# Patient Record
Sex: MALE | Race: BLACK OR AFRICAN AMERICAN | NOT HISPANIC OR LATINO | Employment: OTHER | URBAN - METROPOLITAN AREA
[De-identification: names, ages, dates, MRNs, and addresses within clinical notes are randomized per-mention and may not be internally consistent; named-entity substitution may affect disease eponyms.]

---

## 2024-11-12 ENCOUNTER — APPOINTMENT (OUTPATIENT)
Dept: OTOLARYNGOLOGY | Facility: CLINIC | Age: 84
End: 2024-11-12
Payer: COMMERCIAL

## 2024-11-12 VITALS — WEIGHT: 172 LBS | BODY MASS INDEX: 27 KG/M2 | HEIGHT: 67 IN

## 2024-11-12 DIAGNOSIS — R49.0 CHRONIC HOARSENESS: ICD-10-CM

## 2024-11-12 DIAGNOSIS — J34.89 NASAL SEPTAL PERFORATION: ICD-10-CM

## 2024-11-12 DIAGNOSIS — K21.9 LARYNGOPHARYNGEAL REFLUX (LPR): Primary | ICD-10-CM

## 2024-11-12 DIAGNOSIS — H61.23 BILATERAL IMPACTED CERUMEN: ICD-10-CM

## 2024-11-12 DIAGNOSIS — H90.0 CONDUCTIVE HEARING LOSS, BILATERAL: ICD-10-CM

## 2024-11-12 PROCEDURE — 69210 REMOVE IMPACTED EAR WAX UNI: CPT | Performed by: STUDENT IN AN ORGANIZED HEALTH CARE EDUCATION/TRAINING PROGRAM

## 2024-11-12 PROCEDURE — 31575 DIAGNOSTIC LARYNGOSCOPY: CPT | Performed by: STUDENT IN AN ORGANIZED HEALTH CARE EDUCATION/TRAINING PROGRAM

## 2024-11-12 PROCEDURE — 99204 OFFICE O/P NEW MOD 45 MIN: CPT | Performed by: STUDENT IN AN ORGANIZED HEALTH CARE EDUCATION/TRAINING PROGRAM

## 2024-11-12 PROCEDURE — 1159F MED LIST DOCD IN RCRD: CPT | Performed by: STUDENT IN AN ORGANIZED HEALTH CARE EDUCATION/TRAINING PROGRAM

## 2024-11-12 PROCEDURE — 1036F TOBACCO NON-USER: CPT | Performed by: STUDENT IN AN ORGANIZED HEALTH CARE EDUCATION/TRAINING PROGRAM

## 2024-11-12 RX ORDER — FAMOTIDINE 20 MG/1
20 TABLET, FILM COATED ORAL EVERY EVENING
Qty: 90 TABLET | Refills: 1 | Status: SHIPPED | OUTPATIENT
Start: 2024-11-12 | End: 2025-05-11

## 2024-11-12 RX ORDER — PANTOPRAZOLE SODIUM 40 MG/1
40 TABLET, DELAYED RELEASE ORAL
COMMUNITY
Start: 2022-12-02

## 2024-11-12 RX ORDER — LANOLIN ALCOHOL/MO/W.PET/CERES
400 CREAM (GRAM) TOPICAL DAILY
COMMUNITY

## 2024-11-12 RX ORDER — LOSARTAN POTASSIUM AND HYDROCHLOROTHIAZIDE 25; 100 MG/1; MG/1
1 TABLET ORAL DAILY
COMMUNITY

## 2024-11-12 RX ORDER — LISINOPRIL AND HYDROCHLOROTHIAZIDE 20; 25 MG/1; MG/1
1 TABLET ORAL DAILY
COMMUNITY

## 2024-11-12 RX ORDER — AMLODIPINE BESYLATE 10 MG/1
10 TABLET ORAL
COMMUNITY
Start: 2023-09-04

## 2024-11-12 RX ORDER — METFORMIN HYDROCHLORIDE 500 MG/1
500 TABLET ORAL
COMMUNITY
Start: 2023-09-23

## 2024-11-12 NOTE — PROGRESS NOTES
HPI  Brian Clements is a 84 y.o. male presenting for initial evaluation of multiple ENT complaints.  The patient reports developing hoarseness for several years, states his voice feels raspy with reduced projection after prolonged use.  Endorses throat clearing.  Denies choking/aspiration, dysphagia, odynophagia, neck pain, neck lumps or bumps, fevers, chills, night sweats, weight loss, or bleeding/hemoptysis.  Has a history of GERD for which he takes pantoprazole 40 mg in the mornings    In addition the patient notices bilateral cerumen buildup.  Endorses bilateral progressive hearing loss, states his left hearing is worse than the right.   Denies otorrhea, otalgia, vertigo, aural fullness or autophony.  No history of ear surgeries.    History reviewed. No pertinent past medical history.    History reviewed. No pertinent surgical history.      Current Outpatient Medications on File Prior to Visit   Medication Sig Dispense Refill    amLODIPine (Norvasc) 10 mg tablet Take 1 tablet (10 mg) by mouth once daily.      losartan-hydrochlorothiazide (Hyzaar) 100-25 mg tablet Take 1 tablet by mouth once daily.      magnesium oxide (Mag-Ox) 400 mg (241.3 mg magnesium) tablet Take 1 tablet (400 mg) by mouth once daily.      metFORMIN (Glucophage) 500 mg tablet Take 1 tablet (500 mg) by mouth once daily with breakfast.      pantoprazole (ProtoNix) 40 mg EC tablet Take 1 tablet (40 mg) by mouth once daily in the morning. Take before meals.      lisinopriL-hydrochlorothiazide 20-25 mg tablet Take 1 tablet by mouth once daily. (Patient not taking: Reported on 11/12/2024)       No current facility-administered medications on file prior to visit.        Allergies   Allergen Reactions    Clonidine Unknown     Other reaction(s): Other (See Comments)   Somnolence (drowsiness or sleepiness)    Somnolence (drowsiness or sleepiness)        Review of Systems  A detailed 12 point ROS was performed and is negative except as noted in the intake  form, HPI and/or Past Medical History        Physical Exam   CONSTITUTIONAL: Well developed, well nourished.  VOICE: Normal voice quality  RESPIRATION: Breathing comfortably, no stridor.  CV: No clubbing/cyanosis/edema in hands.  EYES: EOM Intact, sclera normal.  NEURO: Alert and oriented times 3, Cranial nerves V,VII intact and symmetric bilaterally.  HEAD AND FACE: Symmetric facial features, no masses or lesions, sinuses nontender to palpation.  SALIVARY GLANDS: Parotid and submandibular glands normal bilaterally.  + EARS: Normal external ears  Bilateral EACs with cerumen impaction (removed/see procedure note)  Right EAC patent, tympanic membrane intact  Left EAC patent, tympanic membrane intact   + NOSE: External nose midline, anterior rhinoscopy is normal with limited visualization to the anterior aspect of the interior turbinates. No lesions noted.  Bilateral inferior turbinate hypertrophy  Left septal deviation with small anterior perforation, clean  ORAL CAVITY/OROPHARYNX/LIPS: Normal mucous membranes, normal floor of mouth/tongue/OP, no masses or lesions are noted.  PHARYNGEAL WALLS AND NASOPHARYNX: No masses noted. Mucosa appears clean and moist  NECK/LYMPH: No LAD, no thyroid masses. Trachea palpably midline  SKIN: Neck skin is without injury  PSYCH: Alert and oriented with appropriate mood and affect     PROCEDURE NOTE:  FLEXIBLE NASOLARYNGOSCOPY     The risks, benefits, and alternatives were explained.  The patient wished to proceed and provided verbal consent.       INDICATIONS: To visualize anatomy in specific detail; evaluation of symptomatic disorder involving the voice, swallowing, or upper aerodigestive tract, including SOFIA.      DESCRIPTION OF PROCEDURE: After adequate afrin and lidocaine spray, I advanced the endoscope into the nasal cavity.  The nasal cavity, nasopharynx, tongue base, vallecula, posterior/lateral pharyngeal walls, pyriform, epiglottis, post cricoid area, and larynx were within  normal limits.  The following specific findings should be noted:  No lesions/masses or ulcerations  Mobile TVCs bilaterally with presbylarynx changes. Complete glottic closure.  Postcricoid edema  No pooling of secretions     The patient tolerated the procedure without difficulty.     Assessment  Hoarseness  Laryngopharyngeal reflux  Presbylarynx  Septal perforation  Bilateral cerumen impaction    Plan  - Hoarseness, LPR, presbylarynx  Endorses a long history of hoarseness with excessive voice use.  FNP notable for presbylarynx and LPR changes which could be contributing to his symptomatology.  Vocal hygiene, dietary modifications and OTC Gaviscon use discussed.  Currently takes pantoprazole (40mg) in the morning, p.m. famotidine added to his regimen.    Recommended speech therapy, the patient would like to defer at this time.    -Septal perforation  Incidental small anterior septal perforation noted, clean.  The patient will use saline gel throughout the year to avoid mucosal dryness    -Bilateral cerumen impaction, hearing loss  Bilateral cerumen impaction removed, preventive measures discussed.  Hearing at baseline following cerumen removal.  Endorses bilateral progressive hearing loss, we will proceed with audiogram at follow-up.    RTC 6w

## 2024-11-12 NOTE — PATIENT INSTRUCTIONS
"REFLUX (GERD / LPR) INFORMATION SHEET    TYPES OF REFLUX:  *  Gastroesophageal Reflux (GERD):  Backflow of stomach contents (acid) into the esophagus     ·  Symptoms:  Heartburn, regurgitation, swallowing problems  *  Laryngopharyngeal (LPR):  Backflow of stomach contents into the voice box and throat     ·  Symptoms:  Hoarseness, nonproductive throat clearing, cough, swallowing problems, sensation of \"lump\" in the throat, sudden shortness of breath or choking sensation         (especially at night)      WHY DON'T I HAVE HEARTBURN?  Many patients with LPRD do not experience significant heartburn (65%).  Heartburn occurs when the tissue in the esophagus becomes irritated.  The lining in the larynx (voice box) and the upper throat does not have as strong a protective lining as the esophagus, therefore, it is more sensitive to stomach acid.     WHAT CAN I DO TO REDUCE REFLUX?  *   Reduce Stress:  Even a moderate amount of stress can dramatically increase the amount of reflux.    *   Diet:  Try to maintain a healthy body weight.  Eat sensible, moderate amounts.  Eat meals at least 3 hours before bedtime. Avoid bedtime snacks.  Certain foods have been         shown to cause reflux including:     ·  Spicy, acidic and greasy foods     ·  Tomato based foods (spaghetti sauce, Mexican foods, etc.)     ·  Acidic fruit and juices (orange, tomato, cranberry juice, grapefruit, etc.)     ·  Caffeine (tea, coffee, soda, chocolate)     ·  Alcohol     ·  Dairy products     ·  Peppermint    *   Bedtime:  Elevate the head of your bed with 4-6 inch blocks.  Do not elevate your head with extra pillows as this can worsen reflux.  If the entire head of the bed is tilted        upwards, gravity reduces the backflow of acid.   *   Clothing:  Avoid tight belts and other restrictive clothing  *   Smoking:  Avoid smoking and second-hand smoke as this dramatically increases reflux   *   Medications:  NSAIDS (ex. Advil/Motrin type meds), aspirin, " "steroids can aggravate reflux  *   Medical Therapy:     ·  H2 receptor antagonists - cimetidine, ranitidine, famotidine, etc.            o Absorption is reduced 10 to 20 percent by associated antacid administration (ex. Tums)            o Achieve less acid suppression than proton pump inhibitors     ·  Proton pump inhibitor - omeprazole, lansoprazole, pantoprazole, etc.            o PPI's most effective when taken 30 to 60 minutes before meals on an empty stomach            o PPI's can effect absorption of medications (Plavix, Coumadin, etc) so check with your pharmacist prior to initiating therapy for any interactions      ·  Alginates - Gaviscon and Gaviscon Advance              o Block the stomach lining by forming a physical barrier    Once on medical therapy for reflux it can take weeks or months for symptom improvement or resolution as underlying damaged tissue heals (think about a burn on your arm…will take a long time to completely heal).  Persistent symptoms despite appropriate, consistent medical therapy should prompt an evaluation by gastroenterology.        \"[facility] is pleased to meet your health care needs.  We strive to deliver the highest quality and most value based care possible.  Thank you for giving us the opportunity to serve you.\"    "

## 2025-01-28 ENCOUNTER — APPOINTMENT (OUTPATIENT)
Dept: AUDIOLOGY | Facility: CLINIC | Age: 85
End: 2025-01-28
Payer: COMMERCIAL

## 2025-01-28 ENCOUNTER — APPOINTMENT (OUTPATIENT)
Dept: OTOLARYNGOLOGY | Facility: CLINIC | Age: 85
End: 2025-01-28
Payer: COMMERCIAL

## 2025-01-28 VITALS — HEIGHT: 67 IN | BODY MASS INDEX: 27 KG/M2 | WEIGHT: 172 LBS

## 2025-01-28 DIAGNOSIS — H93.12 TINNITUS OF LEFT EAR: ICD-10-CM

## 2025-01-28 DIAGNOSIS — H90.3 ASYMMETRIC SNHL (SENSORINEURAL HEARING LOSS): Primary | ICD-10-CM

## 2025-01-28 DIAGNOSIS — H90.3 SENSORINEURAL HEARING LOSS (SNHL) OF BOTH EARS: Primary | ICD-10-CM

## 2025-01-28 DIAGNOSIS — K21.9 LARYNGOPHARYNGEAL REFLUX (LPR): ICD-10-CM

## 2025-01-28 DIAGNOSIS — J34.89 NASAL SEPTAL PERFORATION: ICD-10-CM

## 2025-01-28 DIAGNOSIS — R49.0 CHRONIC HOARSENESS: ICD-10-CM

## 2025-01-28 PROCEDURE — 1036F TOBACCO NON-USER: CPT | Performed by: STUDENT IN AN ORGANIZED HEALTH CARE EDUCATION/TRAINING PROGRAM

## 2025-01-28 PROCEDURE — 99214 OFFICE O/P EST MOD 30 MIN: CPT | Performed by: STUDENT IN AN ORGANIZED HEALTH CARE EDUCATION/TRAINING PROGRAM

## 2025-01-28 PROCEDURE — 92557 COMPREHENSIVE HEARING TEST: CPT | Performed by: AUDIOLOGIST

## 2025-01-28 PROCEDURE — 92550 TYMPANOMETRY & REFLEX THRESH: CPT | Performed by: AUDIOLOGIST

## 2025-01-28 PROCEDURE — 1159F MED LIST DOCD IN RCRD: CPT | Performed by: STUDENT IN AN ORGANIZED HEALTH CARE EDUCATION/TRAINING PROGRAM

## 2025-01-28 RX ORDER — ASPIRIN 81 MG/1
81 TABLET ORAL DAILY
COMMUNITY

## 2025-01-28 NOTE — PROGRESS NOTES
COMPREHENSIVE AUDIOMETRIC EVALUATION      Name:  Brian Clements  :  1940  Age:  84 y.o.  Date of Evaluation:  25   Referring Provider:   Atilio Wilson MD       History:  Mr. Clements was seen today for an evaluation of hearing.  Patient reported longstanding left tinnitus and bilateral hearing loss (L>R). Patient noted he tried hearing aids in the past with no satisfaction. He endorsed a history of noise exposure from work with no use of hearing protection. When asked, patient denied otalgia, aural fullness, tinnitus, dizziness, and concerns for decreased hearing sensitivity.    See audiometric evaluation at end of this report or scanned under media tab    OTOSCOPY:       Right Ear: Clear canal       Left Ear: Clear canal    226 Hz TYMPANOMETRY:       Right Ear: Type A: normal peak pressure, compliance, and ear canal volume, consistent with middle ear function within normal limits       Left Ear: Type A: normal peak pressure, compliance, and ear canal volume, consistent with middle ear function within normal limits    AUDIOMETRIC EVALUATION (Inserts):       Right Ear: Normal sloping to Moderately Severe, Sensorineural hearing loss                 Left Ear: Severe through 1000 Hz rising to moderate, Sensorineural hearing loss           NOTE: Clinically significant asymmetry 125 - 2000 Hz    Test technique:  Standard Audiometry  Reliability:   good    SPEECH RECOGNITION THRESHOLD:       Right Ear:  35 dBHL in good agreement with PTA       Left Ear:  80 dBHL in fair agreement with PTA    WORD RECOGNITION:       Right Ear:  88% good (78-88%) at elevated presentation level       Left Ear:   0% very poor (<54%) at elevated presentation level    NOTE: Patient did not attempt to respond for left ear word recognition due to significant distortion. Therefore, interpret score with caution.    IPSILATERAL ACOUSTIC REFLEXES:       Right Ear:  Absent from 500-2000 Hz.       Left Ear:   Absent from 500-2000 Hz, largely  consistent with patient's hearing sensitivity.     NOTE: Absent reflexes in the right ear and absent in the left ear at 2000 Hz are unexpected given patient's hearing sensitivity. However, this finding should be interpreted with caution due to no other clinical correlates.     DISCUSSION:   Discussed results and recommendations with patient.  Questions were addressed and the patient was encouraged to contact our department should concerns arise.    RECOMMENDATIONS:  - Follow up with Atilio Wilson MD as scheduled.  -Recommend further investigation to rule out retrocochlear involvement as contributor to clinically significant asymmetric hearing loss.  -Following medical clearance, recommend patient schedule hearing aid evaluation through provider in network with Medicaid insurance benefit.  -Recommend patient return for repeated audiometric evaluation should concerns for changes in hearing sensitivity arise or as medically indicated.    FABIO Meeks.  [facility] Audiology Student     Kay Muhammad, CCC-A     Appt: 11:00 - 11:30 PM

## 2025-01-28 NOTE — PATIENT INSTRUCTIONS
"REFLUX (GERD / LPR) INFORMATION SHEET    TYPES OF REFLUX:  *  Gastroesophageal Reflux (GERD):  Backflow of stomach contents (acid) into the esophagus     ·  Symptoms:  Heartburn, regurgitation, swallowing problems  *  Laryngopharyngeal (LPR):  Backflow of stomach contents into the voice box and throat     ·  Symptoms:  Hoarseness, nonproductive throat clearing, cough, swallowing problems, sensation of \"lump\" in the throat, sudden shortness of breath or choking sensation         (especially at night)      WHY DON'T I HAVE HEARTBURN?  Many patients with LPRD do not experience significant heartburn (65%).  Heartburn occurs when the tissue in the esophagus becomes irritated.  The lining in the larynx (voice box) and the upper throat does not have as strong a protective lining as the esophagus, therefore, it is more sensitive to stomach acid.     WHAT CAN I DO TO REDUCE REFLUX?  *   Reduce Stress:  Even a moderate amount of stress can dramatically increase the amount of reflux.    *   Diet:  Try to maintain a healthy body weight.  Eat sensible, moderate amounts.  Eat meals at least 3 hours before bedtime. Avoid bedtime snacks.  Certain foods have been         shown to cause reflux including:     ·  Spicy, acidic and greasy foods     ·  Tomato based foods (spaghetti sauce, Mexican foods, etc.)     ·  Acidic fruit and juices (orange, tomato, cranberry juice, grapefruit, etc.)     ·  Caffeine (tea, coffee, soda, chocolate)     ·  Alcohol     ·  Dairy products     ·  Peppermint    *   Bedtime:  Elevate the head of your bed with 4-6 inch blocks.  Do not elevate your head with extra pillows as this can worsen reflux.  If the entire head of the bed is tilted        upwards, gravity reduces the backflow of acid.   *   Clothing:  Avoid tight belts and other restrictive clothing  *   Smoking:  Avoid smoking and second-hand smoke as this dramatically increases reflux   *   Medications:  NSAIDS (ex. Advil/Motrin type meds), aspirin, " "steroids can aggravate reflux  *   Medical Therapy:     ·  H2 receptor antagonists - cimetidine, ranitidine, famotidine, etc.            o Absorption is reduced 10 to 20 percent by associated antacid administration (ex. Tums)            o Achieve less acid suppression than proton pump inhibitors     ·  Proton pump inhibitor - omeprazole, lansoprazole, pantoprazole, etc.            o PPI's most effective when taken 30 to 60 minutes before meals on an empty stomach            o PPI's can effect absorption of medications (Plavix, Coumadin, etc) so check with your pharmacist prior to initiating therapy for any interactions      ·  Alginates - Gaviscon and Gaviscon Advance              o Block the stomach lining by forming a physical barrier    Once on medical therapy for reflux it can take weeks or months for symptom improvement or resolution as underlying damaged tissue heals (think about a burn on your arm…will take a long time to completely heal).  Persistent symptoms despite appropriate, consistent medical therapy should prompt an evaluation by gastroenterology.        \"[facility] is pleased to meet your health care needs.  We strive to deliver the highest quality and most value based care possible.  Thank you for giving us the opportunity to serve you.\"   "

## 2025-01-28 NOTE — PROGRESS NOTES
HPI  1/28/25  The patient present for follow-up, report significant improvement of his hoarseness/throat clearing with reflux control.  Currently has no oropharyngeal complaints.  Endorses bilateral progressive hearing loss more noticeable in the left, would like to proceed with audiogram.  No other otologic complaints.  Recall   Brian Clements is a 84 y.o. male presenting for initial evaluation of multiple ENT complaints.  The patient reports developing hoarseness for several years, states his voice feels raspy with reduced projection after prolonged use.  Endorses throat clearing.  Denies choking/aspiration, dysphagia, odynophagia, neck pain, neck lumps or bumps, fevers, chills, night sweats, weight loss, or bleeding/hemoptysis.  Has a history of GERD for which he takes pantoprazole 40 mg in the mornings    In addition the patient notices bilateral cerumen buildup.  Endorses bilateral progressive hearing loss, states his left hearing is worse than the right.   Denies otorrhea, otalgia, tinnitus, vertigo, aural fullness or autophony.  No history of ear surgeries.    History reviewed. No pertinent past medical history.    History reviewed. No pertinent surgical history.      Current Outpatient Medications on File Prior to Visit   Medication Sig Dispense Refill    amLODIPine (Norvasc) 10 mg tablet Take 1 tablet (10 mg) by mouth once daily.      aspirin 81 mg EC tablet Take 1 tablet (81 mg) by mouth once daily.      famotidine (Pepcid) 20 mg tablet Take 1 tablet (20 mg) by mouth once daily in the evening. 90 tablet 1    losartan-hydrochlorothiazide (Hyzaar) 100-25 mg tablet Take 1 tablet by mouth once daily.      magnesium oxide (Mag-Ox) 400 mg (241.3 mg magnesium) tablet Take 1 tablet (400 mg) by mouth once daily.      metFORMIN (Glucophage) 500 mg tablet Take 1 tablet (500 mg) by mouth once daily with breakfast.      pantoprazole (ProtoNix) 40 mg EC tablet Take 1 tablet (40 mg) by mouth once daily in the morning. Take  before meals.      lisinopriL-hydrochlorothiazide 20-25 mg tablet Take 1 tablet by mouth once daily. (Patient not taking: Reported on 1/28/2025)       No current facility-administered medications on file prior to visit.        Allergies   Allergen Reactions    Clonidine Unknown     Other reaction(s): Other (See Comments)   Somnolence (drowsiness or sleepiness)    Somnolence (drowsiness or sleepiness)        Review of Systems  A detailed 12 point ROS was performed and is negative except as noted in the intake form, HPI and/or Past Medical History        Physical Exam   CONSTITUTIONAL: Well developed, well nourished.  VOICE: Normal voice quality  RESPIRATION: Breathing comfortably, no stridor.  CV: No clubbing/cyanosis/edema in hands.  EYES: EOM Intact, sclera normal.  NEURO: Alert and oriented times 3, Cranial nerves V,VII intact and symmetric bilaterally.  HEAD AND FACE: Symmetric facial features, no masses or lesions, sinuses nontender to palpation.  SALIVARY GLANDS: Parotid and submandibular glands normal bilaterally.  + EARS: Normal external ears  Bilateral EACs with cerumen impaction (removed/see procedure note)  Right EAC patent, tympanic membrane intact  Left EAC patent, tympanic membrane intact   + NOSE: External nose midline, anterior rhinoscopy is normal with limited visualization to the anterior aspect of the interior turbinates. No lesions noted.  Bilateral inferior turbinate hypertrophy  Left septal deviation with small anterior perforation, clean  ORAL CAVITY/OROPHARYNX/LIPS: Normal mucous membranes, normal floor of mouth/tongue/OP, no masses or lesions are noted.  PHARYNGEAL WALLS AND NASOPHARYNX: No masses noted. Mucosa appears clean and moist  NECK/LYMPH: No LAD, no thyroid masses. Trachea palpably midline  SKIN: Neck skin is without injury  PSYCH: Alert and oriented with appropriate mood and affect     Results:   Audiogram 1/28/2025 personally reviewed  Right: Normal hearing up to 500 Hz sloping to  moderate sensorineural hearing loss.  Speech discrimination score 88%.  Type a tympanogram.  Left: Severe sensorineural hearing loss from 125 to 1000 Hz, moderate sensorineural hearing loss from 2000 to 8000 Hz.  Speech discrimination score 0%.  Type a tympanogram.      Assessment  Asymmetric sensorineural hearing loss  Hoarseness  Laryngopharyngeal reflux  Presbylarynx  Septal perforation    Plan  -Asymmetric sensorineural hearing loss   Audiogram 1/28/2025 notable for asymmetric sensorineural hearing loss.  The condition was reviewed and explained, we will proceed with MRI IAC to evaluate for retrocochlear pathology.    - Hoarseness, LPR, presbylarynx  Reports significant improvement of his hoarseness/throat clearing with reflux control.  Takes pantoprazole 40 mg in the mornings.  He will continue dietary modifications for his control in addition to Gaviscon and Pm famotidine as needed.      -Septal perforation  Incidental small anterior septal perforation noted, clean.   Continue saline gel use to avoid mucosal dryness.     RTC 6w

## 2025-02-13 ENCOUNTER — HOSPITAL ENCOUNTER (OUTPATIENT)
Dept: RADIOLOGY | Facility: CLINIC | Age: 85
Discharge: HOME | End: 2025-02-13
Payer: COMMERCIAL

## 2025-02-13 DIAGNOSIS — H90.3 ASYMMETRIC SNHL (SENSORINEURAL HEARING LOSS): ICD-10-CM

## 2025-02-13 PROCEDURE — A9575 INJ GADOTERATE MEGLUMI 0.1ML: HCPCS | Performed by: STUDENT IN AN ORGANIZED HEALTH CARE EDUCATION/TRAINING PROGRAM

## 2025-02-13 PROCEDURE — 2550000001 HC RX 255 CONTRASTS: Performed by: STUDENT IN AN ORGANIZED HEALTH CARE EDUCATION/TRAINING PROGRAM

## 2025-02-13 PROCEDURE — 70553 MRI BRAIN STEM W/O & W/DYE: CPT

## 2025-02-13 RX ORDER — GADOTERATE MEGLUMINE 376.9 MG/ML
15 INJECTION INTRAVENOUS
Status: COMPLETED | OUTPATIENT
Start: 2025-02-13 | End: 2025-02-13

## 2025-02-13 RX ADMIN — GADOTERATE MEGLUMINE 15 ML: 376.9 INJECTION INTRAVENOUS at 11:59

## 2025-02-18 ENCOUNTER — OFFICE VISIT (OUTPATIENT)
Dept: OTOLARYNGOLOGY | Facility: CLINIC | Age: 85
End: 2025-02-18
Payer: COMMERCIAL

## 2025-02-18 DIAGNOSIS — H90.3 ASYMMETRIC SNHL (SENSORINEURAL HEARING LOSS): Primary | ICD-10-CM

## 2025-02-18 DIAGNOSIS — K21.9 LARYNGOPHARYNGEAL REFLUX (LPR): ICD-10-CM

## 2025-02-18 PROCEDURE — 1126F AMNT PAIN NOTED NONE PRSNT: CPT | Performed by: STUDENT IN AN ORGANIZED HEALTH CARE EDUCATION/TRAINING PROGRAM

## 2025-02-18 PROCEDURE — 99213 OFFICE O/P EST LOW 20 MIN: CPT | Performed by: STUDENT IN AN ORGANIZED HEALTH CARE EDUCATION/TRAINING PROGRAM

## 2025-02-18 PROCEDURE — 1036F TOBACCO NON-USER: CPT | Performed by: STUDENT IN AN ORGANIZED HEALTH CARE EDUCATION/TRAINING PROGRAM

## 2025-02-18 PROCEDURE — 1159F MED LIST DOCD IN RCRD: CPT | Performed by: STUDENT IN AN ORGANIZED HEALTH CARE EDUCATION/TRAINING PROGRAM

## 2025-02-18 ASSESSMENT — PATIENT HEALTH QUESTIONNAIRE - PHQ9
SUM OF ALL RESPONSES TO PHQ9 QUESTIONS 1 AND 2: 0
1. LITTLE INTEREST OR PLEASURE IN DOING THINGS: NOT AT ALL
2. FEELING DOWN, DEPRESSED OR HOPELESS: NOT AT ALL

## 2025-02-18 ASSESSMENT — PAIN SCALES - GENERAL: PAINLEVEL_OUTOF10: 0-NO PAIN

## 2025-02-18 ASSESSMENT — COLUMBIA-SUICIDE SEVERITY RATING SCALE - C-SSRS: 1. IN THE PAST MONTH, HAVE YOU WISHED YOU WERE DEAD OR WISHED YOU COULD GO TO SLEEP AND NOT WAKE UP?: NO

## 2025-02-18 NOTE — PROGRESS NOTES
HPI  2/18/25  MRI IAC reviewed and discussed with the patient, no CPA/IAC masses.  1/28/25  The patient present for follow-up, report significant improvement of his hoarseness/throat clearing with reflux control.  Currently has no oropharyngeal complaints.  Endorses bilateral progressive hearing loss more noticeable in the left, would like to proceed with audiogram.  No other otologic complaints.  Recall   Brian Clements is a 84 y.o. male presenting for initial evaluation of multiple ENT complaints.  The patient reports developing hoarseness for several years, states his voice feels raspy with reduced projection after prolonged use.  Endorses throat clearing.  Denies choking/aspiration, dysphagia, odynophagia, neck pain, neck lumps or bumps, fevers, chills, night sweats, weight loss, or bleeding/hemoptysis.  Has a history of GERD for which he takes pantoprazole 40 mg in the mornings    In addition the patient notices bilateral cerumen buildup.  Endorses bilateral progressive hearing loss, states his left hearing is worse than the right.   Denies otorrhea, otalgia, tinnitus, vertigo, aural fullness or autophony.  No history of ear surgeries.    No past medical history on file.    No past surgical history on file.      Current Outpatient Medications on File Prior to Visit   Medication Sig Dispense Refill    amLODIPine (Norvasc) 10 mg tablet Take 1 tablet (10 mg) by mouth once daily.      aspirin 81 mg EC tablet Take 1 tablet (81 mg) by mouth once daily.      famotidine (Pepcid) 20 mg tablet Take 1 tablet (20 mg) by mouth once daily in the evening. 90 tablet 1    lisinopriL-hydrochlorothiazide 20-25 mg tablet Take 1 tablet by mouth once daily. (Patient not taking: Reported on 1/28/2025)      losartan-hydrochlorothiazide (Hyzaar) 100-25 mg tablet Take 1 tablet by mouth once daily.      magnesium oxide (Mag-Ox) 400 mg (241.3 mg magnesium) tablet Take 1 tablet (400 mg) by mouth once daily.      metFORMIN (Glucophage) 500 mg  tablet Take 1 tablet (500 mg) by mouth once daily with breakfast.      pantoprazole (ProtoNix) 40 mg EC tablet Take 1 tablet (40 mg) by mouth once daily in the morning. Take before meals.       No current facility-administered medications on file prior to visit.        Allergies   Allergen Reactions    Clonidine Unknown     Other reaction(s): Other (See Comments)   Somnolence (drowsiness or sleepiness)    Somnolence (drowsiness or sleepiness)        Review of Systems  A detailed 12 point ROS was performed and is negative except as noted in the intake form, HPI and/or Past Medical History        Physical Exam   CONSTITUTIONAL: Well developed, well nourished.  VOICE: Normal voice quality  RESPIRATION: Breathing comfortably, no stridor.  CV: No clubbing/cyanosis/edema in hands.  EYES: EOM Intact, sclera normal.  NEURO: Alert and oriented times 3, Cranial nerves V,VII intact and symmetric bilaterally.  HEAD AND FACE: Symmetric facial features, no masses or lesions, sinuses nontender to palpation.  SALIVARY GLANDS: Parotid and submandibular glands normal bilaterally.  + EARS: Normal external ears  Bilateral EACs with cerumen impaction (removed/see procedure note)  Right EAC patent, tympanic membrane intact  Left EAC patent, tympanic membrane intact   + NOSE: External nose midline, anterior rhinoscopy is normal with limited visualization to the anterior aspect of the interior turbinates. No lesions noted.  Bilateral inferior turbinate hypertrophy  Left septal deviation with small anterior perforation, clean  ORAL CAVITY/OROPHARYNX/LIPS: Normal mucous membranes, normal floor of mouth/tongue/OP, no masses or lesions are noted.  PHARYNGEAL WALLS AND NASOPHARYNX: No masses noted. Mucosa appears clean and moist  NECK/LYMPH: No LAD, no thyroid masses. Trachea palpably midline  SKIN: Neck skin is without injury  PSYCH: Alert and oriented with appropriate mood and affect     Results:   Audiogram 1/28/2025 personally  reviewed  Right: Normal hearing up to 500 Hz sloping to moderate sensorineural hearing loss.  Speech discrimination score 88%.  Type a tympanogram.  Left: Severe sensorineural hearing loss from 125 to 1000 Hz, moderate sensorineural hearing loss from 2000 to 8000 Hz.  Speech discrimination score 0%.  Type a tympanogram.      Assessment  Asymmetric sensorineural hearing loss  Hoarseness  Laryngopharyngeal reflux  Presbylarynx  Septal perforation    Plan  -Asymmetric sensorineural hearing loss   Audiogram 1/28/2025 notable for asymmetric sensorineural hearing loss.    MRI IAC 2/15/25: No CPA/IAC masses  Multiple treatment alternatives discussed, he will like to proceed with [employer] hearing aid, he was referred to audiology for hearing evaluation.    - Hoarseness, LPR, presbylarynx  Reports significant improvement of his hoarseness/throat clearing with reflux control.  He will continue dietary modifications for his control in addition to Gaviscon.  Has transition to pantoprazole use as needed.      -Septal perforation  Incidental small anterior septal perforation noted, clean.   Continue saline gel use to avoid mucosal dryness.     RTC 1y

## 2025-03-19 ENCOUNTER — APPOINTMENT (OUTPATIENT)
Dept: AUDIOLOGY | Facility: CLINIC | Age: 85
End: 2025-03-19
Payer: COMMERCIAL

## 2026-02-23 ENCOUNTER — APPOINTMENT (OUTPATIENT)
Dept: OTOLARYNGOLOGY | Facility: CLINIC | Age: 86
End: 2026-02-23
Payer: COMMERCIAL

## 2026-02-23 ENCOUNTER — APPOINTMENT (OUTPATIENT)
Dept: AUDIOLOGY | Facility: CLINIC | Age: 86
End: 2026-02-23
Payer: COMMERCIAL